# Patient Record
Sex: FEMALE | Race: WHITE | NOT HISPANIC OR LATINO | Employment: UNEMPLOYED | URBAN - METROPOLITAN AREA
[De-identification: names, ages, dates, MRNs, and addresses within clinical notes are randomized per-mention and may not be internally consistent; named-entity substitution may affect disease eponyms.]

---

## 2017-02-21 ENCOUNTER — HOSPITAL ENCOUNTER (EMERGENCY)
Facility: HOSPITAL | Age: 37
Discharge: HOME | End: 2017-02-21
Attending: EMERGENCY MEDICINE | Admitting: EMERGENCY MEDICINE

## 2017-02-21 VITALS
HEIGHT: 64 IN | OXYGEN SATURATION: 98 % | TEMPERATURE: 98.3 F | DIASTOLIC BLOOD PRESSURE: 40 MMHG | RESPIRATION RATE: 16 BRPM | SYSTOLIC BLOOD PRESSURE: 93 MMHG | HEART RATE: 98 BPM | WEIGHT: 140 LBS | BODY MASS INDEX: 23.9 KG/M2

## 2017-02-21 DIAGNOSIS — J10.1 INFLUENZA A: Primary | ICD-10-CM

## 2017-02-21 LAB
FLUAV AG NPH QL: POSITIVE
FLUBV AG NPH QL IA: NEGATIVE

## 2017-02-21 PROCEDURE — 87804 INFLUENZA ASSAY W/OPTIC: CPT | Performed by: NURSE PRACTITIONER

## 2017-02-21 PROCEDURE — 99283 EMERGENCY DEPT VISIT LOW MDM: CPT

## 2017-02-21 PROCEDURE — 25010000002 ONDANSETRON PER 1 MG: Performed by: NURSE PRACTITIONER

## 2017-02-21 PROCEDURE — 96374 THER/PROPH/DIAG INJ IV PUSH: CPT

## 2017-02-21 PROCEDURE — 96361 HYDRATE IV INFUSION ADD-ON: CPT

## 2017-02-21 PROCEDURE — 96375 TX/PRO/DX INJ NEW DRUG ADDON: CPT

## 2017-02-21 PROCEDURE — 25010000002 KETOROLAC TROMETHAMINE PER 15 MG: Performed by: NURSE PRACTITIONER

## 2017-02-21 RX ORDER — ONDANSETRON 2 MG/ML
4 INJECTION INTRAMUSCULAR; INTRAVENOUS ONCE
Status: COMPLETED | OUTPATIENT
Start: 2017-02-21 | End: 2017-02-21

## 2017-02-21 RX ORDER — OSELTAMIVIR PHOSPHATE 75 MG/1
75 CAPSULE ORAL 2 TIMES DAILY
Qty: 10 CAPSULE | Refills: 0 | Status: SHIPPED | OUTPATIENT
Start: 2017-02-21

## 2017-02-21 RX ORDER — SODIUM CHLORIDE 0.9 % (FLUSH) 0.9 %
10 SYRINGE (ML) INJECTION AS NEEDED
Status: DISCONTINUED | OUTPATIENT
Start: 2017-02-21 | End: 2017-02-21 | Stop reason: HOSPADM

## 2017-02-21 RX ORDER — ONDANSETRON 4 MG/1
4 TABLET, ORALLY DISINTEGRATING ORAL EVERY 6 HOURS PRN
Qty: 12 TABLET | Refills: 0 | Status: SHIPPED | OUTPATIENT
Start: 2017-02-21

## 2017-02-21 RX ORDER — KETOROLAC TROMETHAMINE 30 MG/ML
30 INJECTION, SOLUTION INTRAMUSCULAR; INTRAVENOUS ONCE
Status: COMPLETED | OUTPATIENT
Start: 2017-02-21 | End: 2017-02-21

## 2017-02-21 RX ADMIN — KETOROLAC TROMETHAMINE 30 MG: 30 INJECTION, SOLUTION INTRAMUSCULAR at 09:38

## 2017-02-21 RX ADMIN — SODIUM CHLORIDE 1000 ML: 9 INJECTION, SOLUTION INTRAVENOUS at 09:37

## 2017-02-21 RX ADMIN — ONDANSETRON 4 MG: 2 INJECTION INTRAMUSCULAR; INTRAVENOUS at 09:38

## 2017-02-21 NOTE — DISCHARGE INSTRUCTIONS
Medications as ordered  Rest, increase fluids  Alternate Tylenol and Motrin as needed for fever, body aches  Follow up with pmd in 5-7 days if symptoms not improving  Return to er for fever, chills, vomiting, diarrhea, inability to tolerated po fluids, or any new or worsening symptoms

## 2017-02-21 NOTE — ED PROVIDER NOTES
EMERGENCY DEPARTMENT ENCOUNTER    CHIEF COMPLAINT  Chief Complaint: flu-like symptoms  History given by: patient  History limited by: N/A  Room Number: 03/03  PMD: Arnel Sorenson MD      HPI:  Pt is a 36 y.o. female who presents with flu-like symptoms which started yesterday. Specifically, she has had general body aches, nausea, vomiting, fevers (reported max temperature of 102F), chills, rhinorrhea, and dry cough. She denies abd pain, chest pain, trouble breathing, diarrhea, sore throat, and pain and difficulty with urination. She has had sick contact with family members who have also had similar sx. She has not gotten the flu vaccine this season. No significant Past Medical History.     Duration: started yesterday  Timing: constant  Location: generalized  Radiation: N/A  Quality: achy  Intensity/Severity: moderate  Progression: worsening  Associated Symptoms: general body aches, nausea, vomiting, fevers (reported max temperature of 102F), chills, rhinorrhea, dry cough  Aggravating Factors: none  Alleviating Factors: none  Previous Episodes: pt has had similar sx previously with the flu  Treatment before arrival: none    PAST MEDICAL HISTORY  Active Ambulatory Problems     Diagnosis Date Noted   • No Active Ambulatory Problems     Resolved Ambulatory Problems     Diagnosis Date Noted   • No Resolved Ambulatory Problems     No Additional Past Medical History       PAST SURGICAL HISTORY  Past Surgical History   Procedure Laterality Date   • Tubal abdominal ligation         FAMILY HISTORY  History reviewed. No pertinent family history.    SOCIAL HISTORY  Social History     Social History   • Marital status: Single     Spouse name: N/A   • Number of children: N/A   • Years of education: N/A     Occupational History   • Not on file.     Social History Main Topics   • Smoking status: Current Every Day Smoker     Packs/day: 0.50     Types: Cigarettes   • Smokeless tobacco: Not on file   • Alcohol use No   • Drug  use: No   • Sexual activity: Not on file     Other Topics Concern   • Not on file     Social History Narrative   • No narrative on file         ALLERGIES  Levaquin [levofloxacin]    REVIEW OF SYSTEMS  Review of Systems   Constitutional: Positive for chills and fever (max temperature of 102F).   HENT: Positive for rhinorrhea. Negative for sore throat.    Respiratory: Positive for cough (dry cough). Negative for shortness of breath.    Cardiovascular: Negative for chest pain.   Gastrointestinal: Positive for nausea and vomiting.   Genitourinary: Negative for dysuria.   Musculoskeletal: Positive for arthralgias (generalized) and myalgias (generalized). Negative for back pain.   Skin: Negative for rash.   Neurological: Negative for dizziness.   Psychiatric/Behavioral: The patient is not nervous/anxious.        PHYSICAL EXAM  ED Triage Vitals   Temp Heart Rate Resp BP SpO2   02/21/17 0905 02/21/17 0905 02/21/17 0905 02/21/17 0919 02/21/17 0905   98.3 °F (36.8 °C) 130 18 114/61 99 % WNL         Physical Exam   Constitutional: She is oriented to person, place, and time. No distress.   HENT:   Head: Normocephalic.   Right Ear: Tympanic membrane and ear canal normal.   Left Ear: Tympanic membrane and ear canal normal.   Nose: Right sinus exhibits no maxillary sinus tenderness and no frontal sinus tenderness. Left sinus exhibits no maxillary sinus tenderness and no frontal sinus tenderness.   Mouth/Throat: Mucous membranes are normal. No oropharyngeal exudate, posterior oropharyngeal edema or posterior oropharyngeal erythema.   Eyes: No scleral icterus.   Neck: Normal range of motion.   Cardiovascular: Regular rhythm, normal heart sounds and intact distal pulses.  Tachycardia present.    Pulmonary/Chest: Effort normal and breath sounds normal.   Musculoskeletal: Normal range of motion.   Lymphadenopathy:     She has no cervical adenopathy.   Neurological: She is alert and oriented to person, place, and time. She has normal  sensation and normal strength.   Skin: Skin is warm and dry.   Psychiatric: Mood and affect normal.   Nursing note and vitals reviewed.      LAB RESULTS  Recent Results (from the past 24 hour(s))   Influenza Antigen    Collection Time: 02/21/17  9:37 AM   Result Value Ref Range    Influenza A Ag, EIA Positive (A) Negative    Influenza B Ag, EIA Negative Negative       I ordered the above labs and reviewed the results      PROGRESS AND CONSULTS  9:26 AM- Ordered IV fluids for hydration, toradol for pain, and zofran for nausea.   10:17 AM- Reviewed pt's history and workup with Dr. Rodriguez.  At bedside evaluation, they agree with the plan of care.  10:19 AM- Rechecked pt. She is feeling better after ED treatments. She has not vomited in ED. Reviewed implications of results (positive flu swab for flu A), diagnosis, meds, responsibility to follow up, warning signs and symptoms of possible worsening, potential complications and reasons to return to ER with patient.  Discussed all results and noted any abnormalities with patient.  Discussed absolute need to recheck abnormalities and condition with PMD. Informed pt of plan to prescribe antiemetic and tamiflu. Advised pt to rest, to well hydrate, and to alternate between taking tylenol and motrin as needed for fever/general body aches.   Discussed plan for discharge, as there is no emergent indication for admission.  Pt is agreeable and understands need for follow up and repeat testing.  Pt is aware that discharge does not mean that nothing is wrong but it indicates no emergency is present.  Pt is discharged with instructions to follow up with primary care doctor to have their blood pressure rechecked.       DIAGNOSIS  Final diagnoses:   Influenza A       FOLLOW UP   Arnel Sorenson MD  [address] [facility] [address]  [facility] [id number] [phone]    Schedule an appointment as soon as possible for a visit  If symptoms worsen      RX          ondansetron ODT 4 MG  "disintegrating tablet   Commonly known as:  ZOFRAN-ODT   Take 1 tablet by mouth Every 6 (Six) Hours As Needed for nausea or   vomiting.       oseltamivir 75 MG capsule   Commonly known as:  TAMIFLU   Take 1 capsule by mouth 2 (Two) Times a Day.           COURSE & MEDICAL DECISION MAKING  Pertinent Labs that were ordered and reviewed are noted above.  Results were reviewed/discussed with the patient and they were also made aware of online assess.   Pt also made aware that some labs, such as cultures, will not be resulted during ER visit and follow up with PMD is necessary.     MEDICATIONS GIVEN IN ER  Medications   sodium chloride 0.9 % flush 10 mL (not administered)   sodium chloride 0.9 % bolus 1,000 mL (1,000 mL Intravenous New Bag 2/21/17 0937)   ketorolac (TORADOL) injection 30 mg (30 mg Intravenous Given 2/21/17 0938)   ondansetron (ZOFRAN) injection 4 mg (4 mg Intravenous Given 2/21/17 0938)       Visit Vitals   • /69   • Pulse (!) 130   • Temp 98.3 °F (36.8 °C) (Tympanic)   • Resp 18   • Ht 64\" (162.6 cm)   • Wt 140 lb (63.5 kg)   • LMP 02/14/2017   • SpO2 98%   • BMI 24.03 kg/m2         I personally reviewed the past medical history, past surgical history, social history, family history, current medications and allergies as they appear in this chart.  The scribe's note accurately reflects the work and decisions made by me.     I personally scribed for PATTI Cruz on 2/21/2017 at 10:45 AM.  Electronically signed by Karlie Reyes on 2/21/2017 at time 10:45 AM       Karlie Reyes  02/21/17 7102       FEROZ Lopez  02/21/17 2008    "

## 2017-02-21 NOTE — ED PROVIDER NOTES
Pt presents to the ED c/o flu-like sx onset 1 day ago. Pt c/o SOA, rhinorrhea, productive cough w/ clear sputum, generalized myalgias, vomiting, nausea but denies reduced fluid intake. Pt is a smoker, but denies hx of asthma. On exam pt is A&Ox3, NAD, lungs CTAB, appears weak and tired, not septic or toxic appearing. Pt is not immuno-compromised and had a positive flu B test.    I agree with the midlevel provider's findings and plan.  I reviewed the midlevel providers note.    I supervised care provided by the midlevel provider.    We have discussed this patient's history, physical exam, and treatment plan.   I have reviewed the note and personally saw and examined the patient and agree with the plan of care.    Documentation assistance provided by bong Esqueda.  Information recorded by the scribe was done at my direction and has been verified and validated by me.     Roosevelt Esqueda  02/21/17 1105       Gagan Rodriguez MD  02/21/17 4608

## 2017-03-20 ENCOUNTER — HOSPITAL ENCOUNTER (EMERGENCY)
Dept: HOSPITAL 23 - CFTX | Age: 37
Discharge: HOME | End: 2017-03-20
Payer: COMMERCIAL
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
## 2017-03-20 DIAGNOSIS — F31.9: ICD-10-CM

## 2017-03-20 DIAGNOSIS — S93.401A: Primary | ICD-10-CM

## 2017-03-20 DIAGNOSIS — F17.210: ICD-10-CM

## 2023-08-23 ENCOUNTER — HOSPITAL ENCOUNTER (EMERGENCY)
Facility: HOSPITAL | Age: 43
Discharge: HOME | End: 2023-08-23
Attending: EMERGENCY MEDICINE

## 2023-08-23 VITALS
WEIGHT: 160 LBS | OXYGEN SATURATION: 96 % | HEIGHT: 64 IN | DIASTOLIC BLOOD PRESSURE: 74 MMHG | TEMPERATURE: 98.4 F | BODY MASS INDEX: 27.31 KG/M2 | RESPIRATION RATE: 16 BRPM | HEART RATE: 74 BPM | SYSTOLIC BLOOD PRESSURE: 129 MMHG

## 2023-08-23 DIAGNOSIS — K04.7 DENTAL INFECTION: ICD-10-CM

## 2023-08-23 DIAGNOSIS — J02.0 STREP PHARYNGITIS: ICD-10-CM

## 2023-08-23 DIAGNOSIS — S02.5XXA CLOSED FRACTURE OF TOOTH, INITIAL ENCOUNTER: Primary | ICD-10-CM

## 2023-08-23 LAB — S PYO AG THROAT QL: POSITIVE

## 2023-08-23 PROCEDURE — 87880 STREP A ASSAY W/OPTIC: CPT

## 2023-08-23 PROCEDURE — 99282 EMERGENCY DEPT VISIT SF MDM: CPT

## 2023-08-23 RX ORDER — LIDOCAINE HYDROCHLORIDE 20 MG/ML
40 SOLUTION OROPHARYNGEAL AS NEEDED
Qty: 40 ML | Refills: 0 | Status: SHIPPED | OUTPATIENT
Start: 2023-08-23 | End: 2023-08-24 | Stop reason: SDUPTHER

## 2023-08-23 RX ORDER — AMOXICILLIN AND CLAVULANATE POTASSIUM 875; 125 MG/1; MG/1
1 TABLET, FILM COATED ORAL 2 TIMES DAILY
Qty: 20 TABLET | Refills: 0 | Status: SHIPPED | OUTPATIENT
Start: 2023-08-23 | End: 2023-08-24 | Stop reason: SDUPTHER

## 2023-08-23 NOTE — Clinical Note
[facility] EMERGENCY DEPARTMENT  [address] [facility] [address]  Phone: [phone]    Pauline Lozano was seen and treated in our emergency department on 8/23/2023.  She may return to work on 08/24/2023.         Thank you for choosing [facility].    Simba Suggs III, PA

## 2023-08-23 NOTE — ED TRIAGE NOTES
Patient to ed from  with complaints of a sore throat. States she had a tooth break off of her right upper tooth and believes the infection has spread.

## 2023-08-24 RX ORDER — LIDOCAINE HYDROCHLORIDE 20 MG/ML
40 SOLUTION OROPHARYNGEAL AS NEEDED
Qty: 40 ML | Refills: 0 | Status: SHIPPED | OUTPATIENT
Start: 2023-08-24

## 2023-08-24 RX ORDER — AMOXICILLIN AND CLAVULANATE POTASSIUM 875; 125 MG/1; MG/1
1 TABLET, FILM COATED ORAL 2 TIMES DAILY
Qty: 20 TABLET | Refills: 0 | Status: SHIPPED | OUTPATIENT
Start: 2023-08-24

## 2023-08-24 NOTE — ED PROVIDER NOTES
EMERGENCY DEPARTMENT ENCOUNTER    Room Number:  S01/01  PCP: Arnel Sorenson MD      HPI:  Chief Complaint: Sore throat and dental pain  A complete HPI/ROS/PMH/PSH/SH/FH are unobtainable due to: Nothing  Context: Pauline Lozano is a 43 y.o. female who presents to the ED c/o sore throat and dental pain.  Patient states she had tooth fracture patient to the sore throat.  This began 4 days ago.  Gradually worsened since onset.  There is no cough.  Patient reports chills.  She reports cervical lymphadenopathy.  She denies neck pain.  She states she does have a dentist but has been unable to schedule an appointment.  Pain is said to be moderate.  She is here for further evaluation.          PAST MEDICAL HISTORY  Active Ambulatory Problems     Diagnosis Date Noted    No Active Ambulatory Problems     Resolved Ambulatory Problems     Diagnosis Date Noted    No Resolved Ambulatory Problems     No Additional Past Medical History         PAST SURGICAL HISTORY  Past Surgical History:   Procedure Laterality Date    TUBAL ABDOMINAL LIGATION           FAMILY HISTORY  No family history on file.      SOCIAL HISTORY  Social History     Socioeconomic History    Marital status:    Tobacco Use    Smoking status: Every Day     Packs/day: 0.50     Types: Cigarettes   Substance and Sexual Activity    Alcohol use: No    Drug use: No         ALLERGIES  Levaquin [levofloxacin]        REVIEW OF SYSTEMS  Review of Systems     All systems reviewed and negative except for those discussed in HPI.       PHYSICAL EXAM  ED Triage Vitals   Temp Heart Rate Resp BP SpO2   08/23/23 1854 08/23/23 1854 08/23/23 1922 08/23/23 1906 08/23/23 1854   98.4 øF (36.9 øC) (!) 129 16 115/66 98 %      Temp src Heart Rate Source Patient Position BP Location FiO2 (%)   08/23/23 1854 -- -- -- --   Tympanic           Physical Exam      GENERAL: Acute distress  HENT: nares patent.  Tooth fracture tooth 17.  Mild pharyngeal erythema, anterior cervical  lymphadenopathy  EYES: no scleral icterus  CV: regular rhythm, normal rate  RESPIRATORY: normal effort  ABDOMEN: soft  MUSCULOSKELETAL: no deformity  NEURO: alert, moves all extremities, follows commands  PSYCH:  calm, cooperative  SKIN: warm, dry    Vital signs and nursing notes reviewed.          LAB RESULTS  Recent Results (from the past 24 hour(s))   Rapid Strep A Screen - Swab, Throat    Collection Time: 08/23/23  7:10 PM    Specimen: Throat; Swab   Result Value Ref Range    Strep A Ag Positive (A) Negative       Ordered the above labs and reviewed the results.        RADIOLOGY  No Radiology Exams Resulted Within Past 24 Hours    Ordered the above noted radiological studies. Reviewed by me in PACS.          PROCEDURES  Procedures        MEDICATIONS GIVEN IN ER  Medications - No data to display      MEDICAL DECISION MAKING, PROGRESS, and CONSULTS    Discussion below represents my analysis of pertinent findings related to patient's condition, differential diagnosis, treatment plan and final disposition.      Orders placed during this visit:  Orders Placed This Encounter   Procedures    Rapid Strep A Screen - Swab, Throat         Additional sources:  - Discussed/obtained information from independent historians: None available  Additional information was obtained to confirm the patient's history.    - External (non-ED) record review: Patient is followed by Dr. Clementina deleon with [facility].  She is last seen in 2/1/2016 for a breast nodule.  It was favored to be benign.  She was consulted to a breast specialist.  She was to follow-up as needed.         - Chronic or social conditions impacting care: None        Differential diagnosis:    Strep pharyngitis, viral pharyngitis, tooth fracture, early apical abscess.  Will obtain rapid strep to further evaluate.        Independent interpretation of labs, radiology studies, and discussions with consultants:  ED Course as of 08/24/23 0542   Wed Aug 23, 2023   2052  Strep A Ag(!): Positive [RC]   2122 Strep A Ag(!): Positive [RC]   2122 Strep is positive.  We will go ahead and treat the strep pharyngitis and for potential apical abscess with Augmentin 875 twice daily for 10 days.  Will provide viscous lidocaine for lidocaine balls as well as a short course of diclofenac for pain. [RC]      ED Course User Index  [RC] Simba Suggs III, PA               DIAGNOSIS  Final diagnoses:   Closed fracture of tooth, initial encounter   Dental infection   Strep pharyngitis         DISPOSITION  DISCHARGE    Patient discharged in stable condition.    Reviewed implications of results, diagnosis, meds, responsibility to follow up, warning signs and symptoms of possible worsening, potential complications and reasons to return to ER.    Patient/Family voiced understanding of above instructions.    Discussed plan for discharge, as there is no emergent indication for admission. Patient referred to primary care provider for BP management due to today's BP. Pt/family is agreeable and understands need for follow up and repeat testing.  Pt is aware that discharge does not mean that nothing is wrong but it indicates no emergency is present that requires admission and they must continue care with follow-up as given below or physician of their choice.     FOLLOW-UP  [facility] ED  [address] [facility] [address] [phone]  Schedule an appointment as soon as possible for a visit   For further evaluation and treatment         Medication List        New Prescriptions      amoxicillin-clavulanate 875-125 MG per tablet  Commonly known as: AUGMENTIN  Take 1 tablet by mouth 2 (Two) Times a Day.     diclofenac 50 MG EC tablet  Commonly known as: VOLTAREN  Take 1 tablet by mouth 3 (Three) Times a Day.     Lidocaine Viscous HCl 2 % solution  Commonly known as: XYLOCAINE  Take 40 mL by mouth As Needed for Mild Pain (Dental wall and apply over painful tooth every 6 hours  as needed).               Where to Get Your Medications        These medications were sent to [id number] - [place], [place] - [address] [facility] [phone]  - [phone]   [address], [facility] [address]      Phone: [phone]   amoxicillin-clavulanate 875-125 MG per tablet  diclofenac 50 MG EC tablet  Lidocaine Viscous HCl 2 % solution            Latest Documented Vital Signs:  As of 05:42 EDT  BP- 129/74 HR- 74 Temp- 98.4 øF (36.9 øC) (Tympanic) O2 sat- 96%      --    Please note that portions of this were completed with a voice recognition program.       Note Disclaimer: At [facility], we believe that sharing information builds trust and better relationships. You are receiving this note because you are receiving care at [facility] or recently visited. It is possible you will see health information before a provider has talked with you about it. This kind of information can be easy to misunderstand. To help you fully understand what it means for your health, we urge you to discuss this note with your provider.         Simba Suggs III, PA  08/24/23 0584